# Patient Record
Sex: FEMALE | Race: WHITE | URBAN - METROPOLITAN AREA
[De-identification: names, ages, dates, MRNs, and addresses within clinical notes are randomized per-mention and may not be internally consistent; named-entity substitution may affect disease eponyms.]

---

## 2018-02-21 ENCOUNTER — TRANSFERRED RECORDS (OUTPATIENT)
Dept: HEALTH INFORMATION MANAGEMENT | Facility: CLINIC | Age: 33
End: 2018-02-21

## 2018-02-27 ENCOUNTER — OFFICE VISIT (OUTPATIENT)
Dept: OBGYN | Facility: CLINIC | Age: 33
End: 2018-02-27
Payer: COMMERCIAL

## 2018-02-27 VITALS
SYSTOLIC BLOOD PRESSURE: 108 MMHG | WEIGHT: 120 LBS | BODY MASS INDEX: 19.99 KG/M2 | HEIGHT: 65 IN | DIASTOLIC BLOOD PRESSURE: 66 MMHG

## 2018-02-27 DIAGNOSIS — N92.0 MENORRHAGIA WITH REGULAR CYCLE: Primary | ICD-10-CM

## 2018-02-27 LAB — HGB BLD-MCNC: 12.3 G/DL (ref 11.7–15.7)

## 2018-02-27 PROCEDURE — 36415 COLL VENOUS BLD VENIPUNCTURE: CPT | Performed by: OBSTETRICS & GYNECOLOGY

## 2018-02-27 PROCEDURE — 99204 OFFICE O/P NEW MOD 45 MIN: CPT | Performed by: OBSTETRICS & GYNECOLOGY

## 2018-02-27 PROCEDURE — 85018 HEMOGLOBIN: CPT | Performed by: OBSTETRICS & GYNECOLOGY

## 2018-02-27 ASSESSMENT — ANXIETY QUESTIONNAIRES
2. NOT BEING ABLE TO STOP OR CONTROL WORRYING: NOT AT ALL
1. FEELING NERVOUS, ANXIOUS, OR ON EDGE: NOT AT ALL
5. BEING SO RESTLESS THAT IT IS HARD TO SIT STILL: NOT AT ALL
IF YOU CHECKED OFF ANY PROBLEMS ON THIS QUESTIONNAIRE, HOW DIFFICULT HAVE THESE PROBLEMS MADE IT FOR YOU TO DO YOUR WORK, TAKE CARE OF THINGS AT HOME, OR GET ALONG WITH OTHER PEOPLE: NOT DIFFICULT AT ALL
6. BECOMING EASILY ANNOYED OR IRRITABLE: SEVERAL DAYS
GAD7 TOTAL SCORE: 1
3. WORRYING TOO MUCH ABOUT DIFFERENT THINGS: NOT AT ALL
7. FEELING AFRAID AS IF SOMETHING AWFUL MIGHT HAPPEN: NOT AT ALL

## 2018-02-27 ASSESSMENT — PATIENT HEALTH QUESTIONNAIRE - PHQ9: 5. POOR APPETITE OR OVEREATING: NOT AT ALL

## 2018-02-27 NOTE — PROGRESS NOTES
SUBJECTIVE:                                                   Rocío Goodson is a 32 year old female who presents to clinic today for the following health issue(s):  Patient presents with:  Consult: Enlarged Uterus.     HPI:  Patient is a 32  Year old  who is seen for consultation regarding a recent ultrasound that showed an enlarged uterus.  Patient had a  in 2016.  She had a previous normal vaginal delivery.  Recently the patient complains of very heavy menses where she'll soak through double protection and pass large clots.  She has lower abdominal pain and discomfort.  Her ultrasound showed a generally enlarged uterus.  Ovaries were normal.  She had bilateral salpingectomy done at the time of her  for permanent birth control.    Patient's last menstrual period was 2018..   Patient is sexually active, No obstetric history on file..  Using No fallopian tubes for contraception.    reports that she has been smoking.  She has never used smokeless tobacco.  Tobacco Cessation Action Plan: Information offered: Patient not interested at this time  STD testing offered?  Declined    Health maintenance updated:  yes    Today's PHQ-2 Score: No flowsheet data found.  Today's PHQ-9 Score:   PHQ-9 SCORE 2018   Total Score 1     Today's DANIELLA-7 Score:   DANIELLA-7 SCORE 2018   Total Score 1       Problem list and histories reviewed & adjusted, as indicated.  Additional history: as documented.    Patient Active Problem List   Diagnosis     Attention deficit disorder     Anxiety     Other and unspecified noninfectious gastroenteritis and colitis(558.9)     No past surgical history on file.   Social History   Substance Use Topics     Smoking status: Current Every Day Smoker     Smokeless tobacco: Never Used     Alcohol use Yes           Current Outpatient Prescriptions   Medication Sig     amphetamine-dextroamphetamine (ADDERALL) 10 MG tablet 10 mg in the afternoon     No current  "facility-administered medications for this visit.      Allergies   Allergen Reactions     Amoxicillin Unknown       ROS:  12 point review of systems negative other than symptoms noted below.  Constitutional: Fatigue  Eyes: Spots  Gastrointestinal: Abdominal Pain  Genitourinary: Cramps, Frequency, Heavy Bleeding with Period, Irregular Menses, Painful [place] and Painful Urination  Musculoskeletal: Muscle Cramps  Psychiatric: Anxiety    OBJECTIVE:     /66  Ht 5' 5\" (1.651 m)  Wt 120 lb (54.4 kg)  LMP 02/26/2018  Breastfeeding? No  BMI 19.97 kg/m2  Body mass index is 19.97 kg/(m^2).    Exam:  Constitutional:  Appearance: Well nourished, well developed alert, in no acute distress  Neck:  Lymph Nodes:  No lymphadenopathy present; Thyroid:  Gland size normal, nontender, no nodules or masses present on palpation  Chest:  Respiratory Effort:  Breathing unlabored  Cardiovascular: No edema  Gastrointestinal:  Abdominal Examination:  Abdomen nontender to palpation, tone normal without rigidity or guarding, no masses present, umbilicus without lesions; Liver/Spleen:  No hepatomegaly present, liver nontender to palpation; Hernias:  No hernias present  Lymphatic: Lymph Nodes:  No other lymphadenopathy present  Skin:General Inspection:  No rashes present, no lesions present, no areas of discoloration; Genitalia and Groin:  No rashes present, no lesions present, no areas of discoloration, no masses present.  Neurologic/Psychiatric:  Mental Status:  Oriented X3   Pelvic Exam:  External Genitalia:     Normal appearance for age, no discharge present, no tenderness present, no inflammatory lesions present, color normal  Vagina:     Normal vaginal vault without central or paravaginal defects, no discharge present, no inflammatory lesions present, no masses present  Bladder:     Nontender to palpation  Urethra:   Urethral Body:  Urethra palpation normal, urethra structural support normal   Urethral Meatus:  No erythema or " lesions present  Cervix:     Appearance healthy, no lesions present, nontender to palpation, no bleeding present  Uterus:     Uterus: firm, normal sized and nontender, retroverted in position.   Adnexa:     No adnexal tenderness present, no adnexal masses present  Perineum:     Perineum within normal limits, no evidence of trauma, no rashes or skin lesions present  Anus:     Anus within normal limits, no hemorrhoids present  Inguinal Lymph Nodes:     No lymphadenopathy present  Pubic Hair:     Normal pubic hair distribution for age  Genitalia and Groin:     No rashes present, no lesions present, no areas of discoloration, no masses present       In-Clinic Test Results:  Results for orders placed or performed in visit on 02/27/18 (from the past 24 hour(s))   Hemoglobin   Result Value Ref Range    Hemoglobin 12.3 11.7 - 15.7 g/dL        Result Narrative   INDICATION:  Pelvic pain.    TECHNIQUE:  Transabdominal and transvaginal scanning was performed. Transvaginal scanning was performed to optimally evaluate the endometrium and adnexa. Ovarian blood flow was evaluated with color-flow and pulsed Doppler.    COMPARISON:  Pelvic ultrasound of 09/29/2016 performed at [facility].    FINDINGS:  The uterus is borderline enlarged at 8.1 cm in length and 6.4 x 4.1 cm in cross-section. The uterus is retroflexed. No uterine mass is evident. The endometrial stripe is normal in thickness at 7 mm.       The ovaries are normal in size and contain a number of follicles. The right ovary measures 2.6 x 1.4 x 1.2 cm and left 3.6 x 1.9 x 1.8 cm. Ovarian blood flow is demonstrated with color-flow and pulsed Doppler.     No adnexal mass is evident. A trace of physiologic free fluid is noted.    IMPRESSION:  1. Normal ovaries and adnexa.  2. Trace of physiologic free fluid.  3. Retroflexed, borderline enlarged uterus.      Dictated by Brian Arndt MD @ Feb 21 2018  2:08PM    (Electronically Signed)         ASSESSMENT/PLAN:                                                         ICD-10-CM    1. Menorrhagia with regular cycle N92.0 HYSTEROSCOPY DIAGOSTIC (SEPARATE PROC)     Hemoglobin           Plan: The patient return to clinic for hysteroscopy.  I discussed various treatments with the patient including endometrial ablation and possible supracervical hysterectomy.    Bryan Koch MD  [facility] [place]

## 2018-02-27 NOTE — MR AVS SNAPSHOT
"              After Visit Summary   2/27/2018    Rocío Goodson    MRN: [id number]           Patient Information     Date Of Birth          1985        Visit Information        Provider Department      2/27/2018 9:45 AM Bryan Koch MD [facility]        Today's Diagnoses     Menorrhagia with regular cycle    -  1       Follow-ups after your visit        Your next 10 appointments already scheduled     Mar 14, 2018  1:30 PM CDT   Hysteroscopy with Bryan Koch MD, AURORA Mcnair CNP, WE PROC [facility] ([facility])    [address] [id number] [phone]              Who to contact     If you have questions or need follow up information about today's clinic visit or your schedule please contact [facility] directly at [phone].  Normal or non-critical lab and imaging results will be communicated to you by MyChart, letter or phone within 4 business days after the clinic has received the results. If you do not hear from us within 7 days, please contact the clinic through [employer] or phone. If you have a critical or abnormal lab result, we will notify you by phone as soon as possible.  Submit refill requests through [employer] or call your pharmacy and they will forward the refill request to us. Please allow 3 business days for your refill to be completed.          Additional Information About Your Visit        [employer] Information     [employer] lets you send messages to your doctor, view your test results, renew your prescriptions, schedule appointments and more. To sign up, go to [email or web address] . Click on \"Log in\" on the left side of the screen, which will take you to the Welcome page. Then click on \"Sign up Now\" on the right side of the page.     You will be asked to enter the access code listed below, as well as some personal information. Please " "follow the directions to create your username and password.     Your access code is: [email or web address]  Expires: 2018 10:30 AM     Your access code will  in 90 days. If you need help or a new code, please call your [place] clinic or [phone].        Care EveryWhere ID     This is your Care EveryWhere ID. This could be used by other organizations to access your [place] medical records  [id number]        Your Vitals Were     Height Last Period Breastfeeding? BMI (Body Mass Index)          5' 5\" (1.651 m) 2018 No 19.97 kg/m2         Blood Pressure from Last 3 Encounters:   18 108/66   01/08/15 110/80   01/09/15 110/80    Weight from Last 3 Encounters:   18 120 lb (54.4 kg)   01/08/15 121 lb (54.9 kg)   01/09/15 121 lb (54.9 kg)              We Performed the Following     Hemoglobin     HYSTEROSCOPY DIAGOSTIC (SEPARATE PROC)        Primary Care Provider Office Phone # Fax #    [facility] [phone]       [address] [facility]  [facility] [address]        Equal Access to Services     MEDARDO GOLDSTEIN : Hadii stephen ku hadasho Soomaali, waaxda luqadaha, qaybta kaalmada adeegyada, waxay juan desir. So [facility] [phone].    ATENCIÓN: Si habla español, tiene a araya disposición servicios gratuitos de asistencia lingüística. Llchele al [phone].    We comply with applicable federal civil rights laws and [place] laws. We do not discriminate on the basis of race, color, national origin, age, disability, sex, sexual orientation, or gender identity.            Thank you!     Thank you for choosing [facility] [place]  for your care. Our goal is always to provide you with excellent care. Hearing back from our patients is one way we can continue to improve our services. Please take a few minutes to complete the written survey that you may receive in the mail after your visit with us. Thank " you!             Your Updated Medication List - Protect others around you: Learn how to safely use, store and throw away your medicines at www.disposemymeds.org.          This list is accurate as of 2/27/18 11:09 AM.  Always use your most recent med list.                   Brand Name Dispense Instructions for use Diagnosis    ADDERALL 10 MG per tablet   Generic drug:  amphetamine-dextroamphetamine     60 tablet    10 mg in the afternoon

## 2018-02-28 ASSESSMENT — PATIENT HEALTH QUESTIONNAIRE - PHQ9: SUM OF ALL RESPONSES TO PHQ QUESTIONS 1-9: 1

## 2018-02-28 ASSESSMENT — ANXIETY QUESTIONNAIRES: GAD7 TOTAL SCORE: 1

## 2020-11-14 ENCOUNTER — HEALTH MAINTENANCE LETTER (OUTPATIENT)
Age: 35
End: 2020-11-14

## 2021-05-31 ENCOUNTER — RECORDS - HEALTHEAST (OUTPATIENT)
Dept: ADMINISTRATIVE | Facility: CLINIC | Age: 36
End: 2021-05-31

## 2021-09-12 ENCOUNTER — HEALTH MAINTENANCE LETTER (OUTPATIENT)
Age: 36
End: 2021-09-12

## 2022-01-02 ENCOUNTER — HEALTH MAINTENANCE LETTER (OUTPATIENT)
Age: 37
End: 2022-01-02

## 2022-10-30 ENCOUNTER — HEALTH MAINTENANCE LETTER (OUTPATIENT)
Age: 37
End: 2022-10-30

## 2023-04-08 ENCOUNTER — HEALTH MAINTENANCE LETTER (OUTPATIENT)
Age: 38
End: 2023-04-08